# Patient Record
Sex: FEMALE | Race: BLACK OR AFRICAN AMERICAN | NOT HISPANIC OR LATINO | URBAN - METROPOLITAN AREA
[De-identification: names, ages, dates, MRNs, and addresses within clinical notes are randomized per-mention and may not be internally consistent; named-entity substitution may affect disease eponyms.]

---

## 2017-09-29 ENCOUNTER — APPOINTMENT (RX ONLY)
Dept: URBAN - METROPOLITAN AREA SURGERY 2 | Facility: SURGERY | Age: 32
Setting detail: DERMATOLOGY
End: 2017-09-29

## 2017-09-29 DIAGNOSIS — S01.30 UNSPECIFIED OPEN WOUND OF EAR: ICD-10-CM

## 2017-09-29 PROBLEM — S01.301D UNSPECIFIED OPEN WOUND OF RIGHT EAR, SUBSEQUENT ENCOUNTER: Status: ACTIVE | Noted: 2017-09-29

## 2017-09-29 PROCEDURE — ? SET GLOBAL PERIOD

## 2017-09-29 PROCEDURE — ? EAR LOBE REPAIR COSMETIC

## 2017-09-29 PROCEDURE — ? PATIENT SPECIFIC COUNSELING

## 2017-09-29 ASSESSMENT — LOCATION SIMPLE DESCRIPTION DERM: LOCATION SIMPLE: RIGHT EAR

## 2017-09-29 ASSESSMENT — LOCATION ZONE DERM: LOCATION ZONE: EAR

## 2017-09-29 ASSESSMENT — LOCATION DETAILED DESCRIPTION DERM: LOCATION DETAILED: RIGHT ANTERIOR EARLOBE

## 2017-09-29 NOTE — PROCEDURE: PATIENT SPECIFIC COUNSELING
Other (Free Text): Patient was educated on ear lobe repair. Patient was informed that the procedure will be done in the office under local anesthesia. Patient was informed that she will have sutures that will need to be removed after 7 days. Patient was informed that she should not do anything to strenuous today. Discussed with patient that she is to wear a bandaid over night, and then leave it uncovered after that.
Detail Level: Simple

## 2017-09-29 NOTE — HPI: EAR (TORN EARLOBE)
Is This A New Presentation, Or A Follow-Up?: Michael Donaldson
Which Ear(S) Are You Concerned About?: right ear
Which Side(S)?: the right ear
How Severe Is The Deformity?: moderate
How Many Torn Pierceholes?: 1

## 2017-09-29 NOTE — PROCEDURE: SET GLOBAL PERIOD
Date Of Surgery (Mm/Dd/Yyyy): 09/29/2017
Detail Level: Simple
Other Surgery Performed: torn earlobe repair
Surgery Global Period: 90

## 2017-09-29 NOTE — PROCEDURE: EAR LOBE REPAIR COSMETIC
Epidermal Sutures: 6-0 Prolene
Repair Without Preservation Of Piercehole Text: The torn portion of the earlobe was excised completely with a #15 scalpel blade to create fresh edges. The edges were undermined slightly to allow them to splay for proper closure.
Surgeon: Rochelle
Consent: The benefits, alternatives, risks, and complications of earlobe repair were discussed including, but not limited to, the risks of pain, infection, bleeding, persistent or worse cosmetic defect, inability to hold earrings, requirement for additional piercing, asymmetry, and need for further repair, among others.
Hemostasis: electrocautery
Suture Removal: 7 days
Repair, Right Ear: Repair without Preservation of Piercehole
Positioning: The patient was placed in the standard supine position in the usual manner.
Epidermal Closure: simple interrupted
Anesthesia Type: 1% lidocaine with epinephrine
Price $ (Use Numbers Only, No Text Please.): 400
Repair With Preservation Of Piercehole Text: The torn portion of the earlobe was excised completely with a #15 scalpel blade to create fresh edges. The existing piercehole was maintained. The edges were undermined slightly to allow them to splay for proper closure.
Anesthesia Volume In Cc: 6
Repair With Preservation Of Piercehole And Stent Text: The torn portion of the earlobe was excised completely with a #15 scalpel blade to create fresh edges. The edges were undermined slightly to allow them to splay for closure. A stent was created to create a new piercehole at the time of repair and placed in appropriate position.
Estimated Blood Loss (Cc): minimal
Detail Level: Detailed

## 2017-10-06 ENCOUNTER — APPOINTMENT (RX ONLY)
Dept: URBAN - METROPOLITAN AREA SURGERY 2 | Facility: SURGERY | Age: 32
Setting detail: DERMATOLOGY
End: 2017-10-06

## 2017-10-06 DIAGNOSIS — Z48.89 ENCOUNTER FOR OTHER SPECIFIED SURGICAL AFTERCARE: ICD-10-CM

## 2017-10-06 PROCEDURE — ? PATIENT SPECIFIC COUNSELING

## 2017-10-06 PROCEDURE — ? SUTURE REMOVAL

## 2017-10-06 PROCEDURE — 99024 POSTOP FOLLOW-UP VISIT: CPT

## 2017-10-06 PROCEDURE — ? POST-OP CHECK

## 2017-10-06 PROCEDURE — ? COUNSELING - POST-OP CHECK

## 2017-10-06 ASSESSMENT — LOCATION DETAILED DESCRIPTION DERM: LOCATION DETAILED: RIGHT ANTERIOR EARLOBE

## 2017-10-06 ASSESSMENT — LOCATION ZONE DERM: LOCATION ZONE: EAR

## 2017-10-06 ASSESSMENT — LOCATION SIMPLE DESCRIPTION DERM: LOCATION SIMPLE: RIGHT EAR

## 2017-10-06 NOTE — PROCEDURE: PATIENT SPECIFIC COUNSELING
Other (Free Text): Patient was advised that her wound looks great and is healing well without signs of infection. Patient's sutures were removed  and patient was advised to make a follow up appointment, patient was informed that at that time Dr. Byrd will re zapata her ear.
Detail Level: Zone

## 2017-12-27 ENCOUNTER — APPOINTMENT (RX ONLY)
Dept: URBAN - METROPOLITAN AREA CLINIC 12 | Facility: CLINIC | Age: 32
Setting detail: DERMATOLOGY
End: 2017-12-27

## 2017-12-27 DIAGNOSIS — Z48.89 ENCOUNTER FOR OTHER SPECIFIED SURGICAL AFTERCARE: ICD-10-CM

## 2017-12-27 DIAGNOSIS — L90.5 SCAR CONDITIONS AND FIBROSIS OF SKIN: ICD-10-CM

## 2017-12-27 PROCEDURE — ? PATIENT SPECIFIC COUNSELING

## 2017-12-27 PROCEDURE — 99024 POSTOP FOLLOW-UP VISIT: CPT

## 2017-12-27 PROCEDURE — ? POST-OP CHECK

## 2017-12-27 PROCEDURE — ? INTRALESIONAL KENALOG

## 2017-12-27 PROCEDURE — 11900 INJECT SKIN LESIONS </W 7: CPT

## 2017-12-27 PROCEDURE — ? COUNSELING - SCAR

## 2017-12-27 ASSESSMENT — LOCATION SIMPLE DESCRIPTION DERM: LOCATION SIMPLE: RIGHT EAR

## 2017-12-27 ASSESSMENT — LOCATION DETAILED DESCRIPTION DERM: LOCATION DETAILED: RIGHT ANTERIOR EARLOBE

## 2017-12-27 ASSESSMENT — LOCATION ZONE DERM: LOCATION ZONE: EAR

## 2017-12-27 NOTE — PROCEDURE: PATIENT SPECIFIC COUNSELING
Detail Level: Zone
Other (Free Text): Discussed with patient that her scar has thickened.\\nRecommended to patient that today a low dose steroid be injected into her ear, before piercing her ear. \\nPatient is to start massaging her ear with the back of an electric toothbrush next week. \\nPatient will return to clinic in 3-4 weeks for ear piercing.

## 2017-12-27 NOTE — PROCEDURE: INTRALESIONAL KENALOG
Consent: The risks of atrophy were reviewed with the patient.
Detail Level: Detailed
Concentration (Mg/Ml): 3
Total Volume (Ccs): 1
Kenalog Preparation: kenalog

## 2017-12-27 NOTE — HPI: POST-OP CHECK
History: Patient presents for a post op check R. ear lobe reconstruction. Patient is her to have her R. Ear re pierced.

## 2017-12-27 NOTE — PROCEDURE: POST-OP CHECK
Detail Level: Detailed
Add Postop Global No-Charge Code (40715)?: yes
Wound Evaluated By: Dr. Andrew Byrd

## 2018-01-23 ENCOUNTER — APPOINTMENT (RX ONLY)
Dept: URBAN - METROPOLITAN AREA CLINIC 12 | Facility: CLINIC | Age: 33
Setting detail: DERMATOLOGY
End: 2018-01-23

## 2018-01-23 DIAGNOSIS — S01.30 UNSPECIFIED OPEN WOUND OF EAR: ICD-10-CM

## 2018-01-23 PROBLEM — S01.301D UNSPECIFIED OPEN WOUND OF RIGHT EAR, SUBSEQUENT ENCOUNTER: Status: ACTIVE | Noted: 2018-01-23

## 2018-01-23 PROCEDURE — ? PROCEDURE - PIERCING

## 2018-01-23 PROCEDURE — ? PATIENT SPECIFIC COUNSELING

## 2018-01-23 ASSESSMENT — LOCATION SIMPLE DESCRIPTION DERM: LOCATION SIMPLE: RIGHT EAR

## 2018-01-23 ASSESSMENT — LOCATION DETAILED DESCRIPTION DERM: LOCATION DETAILED: RIGHT ANTERIOR EARLOBE

## 2018-01-23 ASSESSMENT — LOCATION ZONE DERM: LOCATION ZONE: EAR

## 2018-01-23 NOTE — PROCEDURE: PROCEDURE - PIERCING
Anesthesia Type: 1% lidocaine with epinephrine
Consent: Informed consent was obtained from the patient prior to the procedure. The risks of the procedure were discussed in detail. Specifically, the risks of infection, scarring, bleeding, prolonged wound healing, pain and allergic reaction were addressed. Prior to the procedure, the piercing site was clearly identified and confirmed by the patient.
Piercing Tool (Describe): 18g needle.
Anesthesia Volume In Cc: 3
Detail Level: Zone
